# Patient Record
Sex: FEMALE | Race: WHITE | NOT HISPANIC OR LATINO | Employment: FULL TIME | URBAN - METROPOLITAN AREA
[De-identification: names, ages, dates, MRNs, and addresses within clinical notes are randomized per-mention and may not be internally consistent; named-entity substitution may affect disease eponyms.]

---

## 2019-02-06 LAB
INFLUENZA A ANTIGEN, POC: NEGATIVE
INFLUENZA B ANTIGEN, POC: NEGATIVE
RAPID GROUP A STREP (OHS): NEGATIVE

## 2019-03-11 LAB — RAPID GROUP A STREP (OHS): NEGATIVE

## 2022-04-11 ENCOUNTER — HISTORICAL (OUTPATIENT)
Dept: ADMINISTRATIVE | Facility: HOSPITAL | Age: 27
End: 2022-04-11

## 2022-04-28 VITALS
DIASTOLIC BLOOD PRESSURE: 90 MMHG | HEIGHT: 70 IN | OXYGEN SATURATION: 98 % | SYSTOLIC BLOOD PRESSURE: 136 MMHG | BODY MASS INDEX: 25.91 KG/M2 | WEIGHT: 181 LBS

## 2022-09-21 ENCOUNTER — HISTORICAL (OUTPATIENT)
Dept: ADMINISTRATIVE | Facility: HOSPITAL | Age: 27
End: 2022-09-21

## 2024-05-16 ENCOUNTER — OFFICE VISIT (OUTPATIENT)
Dept: INTERNAL MEDICINE | Facility: CLINIC | Age: 29
End: 2024-05-16
Payer: COMMERCIAL

## 2024-05-16 VITALS
WEIGHT: 168 LBS | OXYGEN SATURATION: 98 % | SYSTOLIC BLOOD PRESSURE: 120 MMHG | HEIGHT: 70 IN | BODY MASS INDEX: 24.05 KG/M2 | TEMPERATURE: 98 F | DIASTOLIC BLOOD PRESSURE: 72 MMHG | HEART RATE: 98 BPM

## 2024-05-16 DIAGNOSIS — F41.9 ANXIETY: ICD-10-CM

## 2024-05-16 DIAGNOSIS — T75.3XXA MOTION SICKNESS, INITIAL ENCOUNTER: ICD-10-CM

## 2024-05-16 DIAGNOSIS — Z00.00 WELL ADULT EXAM: Primary | ICD-10-CM

## 2024-05-16 PROCEDURE — 1160F RVW MEDS BY RX/DR IN RCRD: CPT | Mod: CPTII,,, | Performed by: NURSE PRACTITIONER

## 2024-05-16 PROCEDURE — 3008F BODY MASS INDEX DOCD: CPT | Mod: CPTII,,, | Performed by: NURSE PRACTITIONER

## 2024-05-16 PROCEDURE — 99385 PREV VISIT NEW AGE 18-39: CPT | Mod: ,,, | Performed by: NURSE PRACTITIONER

## 2024-05-16 PROCEDURE — 1159F MED LIST DOCD IN RCRD: CPT | Mod: CPTII,,, | Performed by: NURSE PRACTITIONER

## 2024-05-16 PROCEDURE — 3078F DIAST BP <80 MM HG: CPT | Mod: CPTII,,, | Performed by: NURSE PRACTITIONER

## 2024-05-16 PROCEDURE — 3074F SYST BP LT 130 MM HG: CPT | Mod: CPTII,,, | Performed by: NURSE PRACTITIONER

## 2024-05-16 RX ORDER — NORETHINDRONE ACETATE AND ETHINYL ESTRADIOL AND FERROUS FUMARATE 1MG-20(24)
1 KIT ORAL EVERY MORNING
COMMUNITY

## 2024-05-16 RX ORDER — ONDANSETRON 4 MG/1
4 TABLET, ORALLY DISINTEGRATING ORAL EVERY 8 HOURS PRN
Qty: 12 TABLET | Refills: 0 | Status: SHIPPED | OUTPATIENT
Start: 2024-05-16

## 2024-05-16 RX ORDER — MECLIZINE HCL 12.5 MG 12.5 MG/1
12.5 TABLET ORAL 3 TIMES DAILY PRN
Qty: 30 TABLET | Refills: 0 | Status: SHIPPED | OUTPATIENT
Start: 2024-05-16

## 2024-05-16 RX ORDER — BUSPIRONE HYDROCHLORIDE 5 MG/1
5 TABLET ORAL 3 TIMES DAILY
COMMUNITY
Start: 2023-12-29

## 2024-05-16 RX ORDER — SERTRALINE HYDROCHLORIDE 25 MG/1
25 TABLET, FILM COATED ORAL DAILY
Qty: 30 TABLET | Refills: 11 | Status: SHIPPED | OUTPATIENT
Start: 2024-05-16 | End: 2025-05-16

## 2024-05-16 NOTE — ASSESSMENT & PLAN NOTE
Start Zoloft 25mg daily + continue buspar 5mg TID PRN  Follow up in 6 weeks  Practice deep breathing or abdominal breathing exercises when anxiety occurs.  Exercise daily. Get sunlight daily.  Avoid caffeine, alcohol and stimulants.  Practice positive phrases and repeat throughout the day, along with yoga and relaxation techniques.  Set healthy boundaries, avoid people and conversations that increase stress.  Educated patient on the risks of serotonin based medications such as serotonin modulators and SSRIs/SNRIs including common side effects of nausea, GI upset, headache dizziness as well as the rare risk for worsening symptoms of depression including development of suicidal thoughts or ideations, and serotonin syndrome.   Reports any symptoms of suicidal or homicidal ideations immediately, if clinic is closed go to nearest emergency room.

## 2024-05-16 NOTE — PROGRESS NOTES
Internal Medicine    Patient ID: [id number]     Chief Complaint: Establish Care    HPI:     Hung Pa is a 28 y.o. female here today for an annual wellness visit. She has not had bloodwork completed prior to visit but will do so in the near future. Overall she feels well. She is having some daily generalized anxiety. Minimal improvement with buspar. Has motion sickness - requesting PRN medications for trips.   No other complaints today.     Health Maintenance         Date Due Completion Date    Hepatitis C Screening Never done ---    Lipid Panel Never done ---    HIV Screening Never done ---    TETANUS VACCINE Never done ---    COVID-19 Vaccine (4 - 2023-24 season) 09/01/2023 1/5/2022    Influenza Vaccine (Season Ended) 09/01/2024 ---    Pap Smear 08/21/2026 8/21/2023             Past Medical History:   Diagnosis Date    Anxiety     Motion sickness 05/16/2024        History reviewed. No pertinent surgical history.     Social History     Tobacco Use    Smoking status: Never    Smokeless tobacco: Never   Substance and Sexual Activity    Alcohol use: Never    Drug use: Never    Sexual activity: Yes     Partners: Male     Birth control/protection: OCP        Current Outpatient Medications   Medication Instructions    busPIRone (BUSPAR) 5 mg, Oral, 3 times daily    meclizine (ANTIVERT) 12.5 mg, Oral, 3 times daily PRN    norethindrone-e.estradioL-iron (MINASTRIN 24 FE) 1 mg-20 mcg(24) /75 mg (4) Chew 1 tablet, Oral, Every morning    ondansetron (ZOFRAN-ODT) 4 mg, Oral, Every 8 hours PRN    sertraline (ZOLOFT) 25 mg, Oral, Daily       Review of patient's allergies indicates:  No Known Allergies     Patient Care Team:  Bruno Lind MD as PCP - General (Internal Medicine)  Charmaine Rudolph MD as Consulting Physician (Obstetrics and Gynecology)     Subjective:     Review of Systems    12 point review of systems conducted, negative except as stated in the history of present illness. See HPI for  "details.    Objective:     Visit Vitals  /72   Pulse 98   Temp 98.2 °F (36.8 °C) (Temporal)   Ht 5' 10" (1.778 m)   Wt 76.2 kg (168 lb)   SpO2 98%   BMI 24.11 kg/m²       Physical Exam  Vitals and nursing note reviewed.   Constitutional:       General: She is not in acute distress.     Appearance: She is not ill-appearing.   HENT:      Head: Normocephalic and atraumatic.      Mouth/Throat:      Mouth: Mucous membranes are moist.      Pharynx: Oropharynx is clear.   Eyes:      General: No scleral icterus.     Extraocular Movements: Extraocular movements intact.      Conjunctiva/sclera: Conjunctivae normal.      Pupils: Pupils are equal, round, and reactive to light.   Neck:      Vascular: No carotid bruit.   Cardiovascular:      Rate and Rhythm: Normal rate and regular rhythm.      Heart sounds: No murmur heard.     No friction rub. No gallop.   Pulmonary:      Effort: Pulmonary effort is normal. No respiratory distress.      Breath sounds: Normal breath sounds. No wheezing, rhonchi or rales.   Abdominal:      General: Abdomen is flat. Bowel sounds are normal. There is no distension.      Palpations: Abdomen is soft. There is no mass.      Tenderness: There is no abdominal tenderness.   Musculoskeletal:         General: Normal range of motion.      Cervical back: Normal range of motion and neck supple.   Skin:     General: Skin is warm and dry.   Neurological:      General: No focal deficit present.      Mental Status: She is alert.   Psychiatric:         Mood and Affect: Mood normal.         Assessment:       ICD-10-CM ICD-9-CM   1. Well adult exam  Z00.00 V70.0   2. Anxiety  F41.9 300.00   3. Motion sickness, initial encounter  T75.3XXA 994.6        Plan:         1. Well adult exam  Overview:  Cervical Cancer Screening -  Dr. Rudolph. No history of abnormal paps. Follow up with GYN Clinic for Pap/Pelvic.      Breast Cancer Screening - No family history. Will initiate at 40. Consider baseline at 35.     Colon " Cancer Screening - No family history. Will initiate at 45.     Eye Exam - Recommend annually.    Dental Exam - Recommend biannual exams.     Vaccinations -   Immunization History   Administered Date(s) Administered    COVID-19, MRNA, LN-S, PF (Pfizer) (Purple Cap) 03/30/2021, 05/03/2021        Orders:  -     CBC Auto Differential; Future; Expected date: 05/16/2024  -     Comprehensive Metabolic Panel; Future; Expected date: 05/16/2024  -     Lipid Panel; Future; Expected date: 05/16/2024  -     TSH; Future; Expected date: 05/16/2024  -     Hemoglobin A1C; Future; Expected date: 05/16/2024  -     Urinalysis; Future; Expected date: 05/16/2024  -     Vitamin D; Future; Expected date: 05/16/2024    2. Anxiety  Assessment & Plan:  Start Zoloft 25mg daily + continue buspar 5mg TID PRN  Follow up in 6 weeks  Practice deep breathing or abdominal breathing exercises when anxiety occurs.  Exercise daily. Get sunlight daily.  Avoid caffeine, alcohol and stimulants.  Practice positive phrases and repeat throughout the day, along with yoga and relaxation techniques.  Set healthy boundaries, avoid people and conversations that increase stress.  Educated patient on the risks of serotonin based medications such as serotonin modulators and SSRIs/SNRIs including common side effects of nausea, GI upset, headache dizziness as well as the rare risk for worsening symptoms of depression including development of suicidal thoughts or ideations, and serotonin syndrome.   Reports any symptoms of suicidal or homicidal ideations immediately, if clinic is closed go to nearest emergency room.        3. Motion sickness, initial encounter  Assessment & Plan:  PRN Meclizine or Zofran ODT given      Other orders  -     sertraline (ZOLOFT) 25 MG tablet; Take 1 tablet (25 mg total) by mouth once daily.  Dispense: 30 tablet; Refill: 11  -     ondansetron (ZOFRAN-ODT) 4 MG TbDL; Take 1 tablet (4 mg total) by mouth every 8 (eight) hours as needed (for  nausea and vomitting).  Dispense: 12 tablet; Refill: 0  -     meclizine (ANTIVERT) 12.5 mg tablet; Take 1 tablet (12.5 mg total) by mouth 3 (three) times daily as needed for Dizziness.  Dispense: 30 tablet; Refill: 0         Follow up in about 6 weeks (around 6/27/2024) for Routine Follow Up. In addition to their scheduled follow up, the patient has also been instructed to follow up on as needed basis.     Future Appointments   Date Time Provider Department Center   6/26/2024  9:20 AM Jed Maldonado FNP [facility] [id number]          SHARON Wallace

## 2024-05-20 ENCOUNTER — LAB VISIT (OUTPATIENT)
Dept: LAB | Facility: HOSPITAL | Age: 29
End: 2024-05-20
Attending: NURSE PRACTITIONER
Payer: COMMERCIAL

## 2024-05-20 DIAGNOSIS — Z00.00 WELL ADULT EXAM: ICD-10-CM

## 2024-05-20 LAB
25(OH)D3+25(OH)D2 SERPL-MCNC: 62 NG/ML (ref 30–80)
ALBUMIN SERPL-MCNC: 3.8 G/DL (ref 3.5–5)
ALBUMIN/GLOB SERPL: 1.3 RATIO (ref 1.1–2)
ALP SERPL-CCNC: 83 UNIT/L (ref 40–150)
ALT SERPL-CCNC: 18 UNIT/L (ref 0–55)
ANION GAP SERPL CALC-SCNC: 9 MEQ/L
AST SERPL-CCNC: 15 UNIT/L (ref 5–34)
BASOPHILS # BLD AUTO: 0.01 X10(3)/MCL
BASOPHILS NFR BLD AUTO: 0.3 %
BILIRUB SERPL-MCNC: 0.9 MG/DL
BUN SERPL-MCNC: 9.2 MG/DL (ref 7–18.7)
CALCIUM SERPL-MCNC: 9.4 MG/DL (ref 8.4–10.2)
CHLORIDE SERPL-SCNC: 106 MMOL/L (ref 98–107)
CHOLEST SERPL-MCNC: 221 MG/DL
CHOLEST/HDLC SERPL: 3 {RATIO} (ref 0–5)
CO2 SERPL-SCNC: 26 MMOL/L (ref 22–29)
CREAT SERPL-MCNC: 0.82 MG/DL (ref 0.55–1.02)
CREAT/UREA NIT SERPL: 11
EOSINOPHIL # BLD AUTO: 0.02 X10(3)/MCL (ref 0–0.9)
EOSINOPHIL NFR BLD AUTO: 0.5 %
ERYTHROCYTE [DISTWIDTH] IN BLOOD BY AUTOMATED COUNT: 12.4 % (ref 11.5–17)
EST. AVERAGE GLUCOSE BLD GHB EST-MCNC: 99.7 MG/DL
GFR SERPLBLD CREATININE-BSD FMLA CKD-EPI: >60 ML/MIN/1.73/M2
GLOBULIN SER-MCNC: 3 GM/DL (ref 2.4–3.5)
GLUCOSE SERPL-MCNC: 89 MG/DL (ref 74–100)
HBA1C MFR BLD: 5.1 %
HCT VFR BLD AUTO: 42.2 % (ref 37–47)
HDLC SERPL-MCNC: 83 MG/DL (ref 35–60)
HGB BLD-MCNC: 13.8 G/DL (ref 12–16)
IMM GRANULOCYTES # BLD AUTO: 0.01 X10(3)/MCL (ref 0–0.04)
IMM GRANULOCYTES NFR BLD AUTO: 0.3 %
LDLC SERPL CALC-MCNC: 123 MG/DL (ref 50–140)
LYMPHOCYTES # BLD AUTO: 1.09 X10(3)/MCL (ref 0.6–4.6)
LYMPHOCYTES NFR BLD AUTO: 28.7 %
MCH RBC QN AUTO: 28.8 PG (ref 27–31)
MCHC RBC AUTO-ENTMCNC: 32.7 G/DL (ref 33–36)
MCV RBC AUTO: 87.9 FL (ref 80–94)
MONOCYTES # BLD AUTO: 0.38 X10(3)/MCL (ref 0.1–1.3)
MONOCYTES NFR BLD AUTO: 10 %
NEUTROPHILS # BLD AUTO: 2.29 X10(3)/MCL (ref 2.1–9.2)
NEUTROPHILS NFR BLD AUTO: 60.2 %
NRBC BLD AUTO-RTO: 0 %
PLATELET # BLD AUTO: 213 X10(3)/MCL (ref 130–400)
PMV BLD AUTO: 10.6 FL (ref 7.4–10.4)
POTASSIUM SERPL-SCNC: 4.4 MMOL/L (ref 3.5–5.1)
PROT SERPL-MCNC: 6.8 GM/DL (ref 6.4–8.3)
RBC # BLD AUTO: 4.8 X10(6)/MCL (ref 4.2–5.4)
SODIUM SERPL-SCNC: 141 MMOL/L (ref 136–145)
TRIGL SERPL-MCNC: 73 MG/DL (ref 37–140)
TSH SERPL-ACNC: 0.62 UIU/ML (ref 0.35–4.94)
VLDLC SERPL CALC-MCNC: 15 MG/DL
WBC # SPEC AUTO: 3.8 X10(3)/MCL (ref 4.5–11.5)

## 2024-05-20 PROCEDURE — 80053 COMPREHEN METABOLIC PANEL: CPT

## 2024-05-20 PROCEDURE — 82306 VITAMIN D 25 HYDROXY: CPT

## 2024-05-20 PROCEDURE — 83036 HEMOGLOBIN GLYCOSYLATED A1C: CPT

## 2024-05-20 PROCEDURE — 84443 ASSAY THYROID STIM HORMONE: CPT

## 2024-05-20 PROCEDURE — 80061 LIPID PANEL: CPT

## 2024-05-20 PROCEDURE — 85025 COMPLETE CBC W/AUTO DIFF WBC: CPT

## 2024-05-20 PROCEDURE — 36415 COLL VENOUS BLD VENIPUNCTURE: CPT

## 2024-05-21 NOTE — PROGRESS NOTES
Please inform patient of results.    1. WBC on low end of normal. Any previous labwork that we can compare? We can repeat in 1 month and add peripheral smear if not.     All other labwork within acceptable limits.    Thanks for all you do,    Jed

## 2024-05-23 ENCOUNTER — TELEPHONE (OUTPATIENT)
Dept: INTERNAL MEDICINE | Facility: CLINIC | Age: 29
End: 2024-05-23
Payer: COMMERCIAL

## 2024-05-23 DIAGNOSIS — D70.9 NEUTROPENIA, UNSPECIFIED TYPE: Primary | ICD-10-CM

## 2024-05-23 NOTE — TELEPHONE ENCOUNTER
----- Message from Juany Campo RN sent at 5/22/2024 10:30 AM CDT -----  Good Morning Zenaida,  This is not a patient of ours.  Thanks,  Juany  ----- Message -----  From: Zenaida Walton MA  Sent: 5/21/2024   4:22 PM CDT  To: [facility] Support Staff      ----- Message -----  From: Jed Maldonado FNP  Sent: 5/21/2024   3:45 PM CDT  To: [facility] Staff    Please inform patient of results.    1. WBC on low end of normal. Any previous labwork that we can compare? We can repeat in 1 month and add peripheral smear if not.     All other labwork within acceptable limits.    Thanks for all you do,    Jed

## 2024-06-24 ENCOUNTER — PATIENT MESSAGE (OUTPATIENT)
Dept: INTERNAL MEDICINE | Facility: CLINIC | Age: 29
End: 2024-06-24
Payer: COMMERCIAL

## 2024-07-09 ENCOUNTER — LAB VISIT (OUTPATIENT)
Dept: LAB | Facility: HOSPITAL | Age: 29
End: 2024-07-09
Attending: INTERNAL MEDICINE
Payer: COMMERCIAL

## 2024-07-09 ENCOUNTER — PATIENT MESSAGE (OUTPATIENT)
Dept: INTERNAL MEDICINE | Facility: CLINIC | Age: 29
End: 2024-07-09
Payer: COMMERCIAL

## 2024-07-09 DIAGNOSIS — D70.9 NEUTROPENIA, UNSPECIFIED TYPE: ICD-10-CM

## 2024-07-09 LAB
BASOPHILS # BLD AUTO: 0.01 X10(3)/MCL
BASOPHILS NFR BLD AUTO: 0.2 %
EOSINOPHIL # BLD AUTO: 0.05 X10(3)/MCL (ref 0–0.9)
EOSINOPHIL NFR BLD AUTO: 1.1 %
ERYTHROCYTE [DISTWIDTH] IN BLOOD BY AUTOMATED COUNT: 12.2 % (ref 11.5–17)
HCT VFR BLD AUTO: 38 % (ref 37–47)
HGB BLD-MCNC: 12.6 G/DL (ref 12–16)
IMM GRANULOCYTES # BLD AUTO: 0.01 X10(3)/MCL (ref 0–0.04)
IMM GRANULOCYTES NFR BLD AUTO: 0.2 %
LYMPHOCYTES # BLD AUTO: 1.76 X10(3)/MCL (ref 0.6–4.6)
LYMPHOCYTES NFR BLD AUTO: 37.4 %
MCH RBC QN AUTO: 28.4 PG (ref 27–31)
MCHC RBC AUTO-ENTMCNC: 33.2 G/DL (ref 33–36)
MCV RBC AUTO: 85.8 FL (ref 80–94)
MONOCYTES # BLD AUTO: 0.24 X10(3)/MCL (ref 0.1–1.3)
MONOCYTES NFR BLD AUTO: 5.1 %
NEUTROPHILS # BLD AUTO: 2.63 X10(3)/MCL (ref 2.1–9.2)
NEUTROPHILS NFR BLD AUTO: 56 %
NRBC BLD AUTO-RTO: 0 %
PLATELET # BLD AUTO: 228 X10(3)/MCL (ref 130–400)
PMV BLD AUTO: 10.6 FL (ref 7.4–10.4)
RBC # BLD AUTO: 4.43 X10(6)/MCL (ref 4.2–5.4)
WBC # BLD AUTO: 4.7 X10(3)/MCL (ref 4.5–11.5)

## 2024-07-09 PROCEDURE — 85025 COMPLETE CBC W/AUTO DIFF WBC: CPT

## 2024-07-09 PROCEDURE — 36415 COLL VENOUS BLD VENIPUNCTURE: CPT

## 2024-07-10 ENCOUNTER — OFFICE VISIT (OUTPATIENT)
Dept: INTERNAL MEDICINE | Facility: CLINIC | Age: 29
End: 2024-07-10
Payer: COMMERCIAL

## 2024-07-10 VITALS
OXYGEN SATURATION: 98 % | TEMPERATURE: 99 F | WEIGHT: 171.31 LBS | RESPIRATION RATE: 16 BRPM | DIASTOLIC BLOOD PRESSURE: 78 MMHG | SYSTOLIC BLOOD PRESSURE: 118 MMHG | BODY MASS INDEX: 24.52 KG/M2 | HEIGHT: 70 IN | HEART RATE: 84 BPM

## 2024-07-10 DIAGNOSIS — F41.9 ANXIETY: Primary | ICD-10-CM

## 2024-07-10 PROCEDURE — 99213 OFFICE O/P EST LOW 20 MIN: CPT | Mod: ,,, | Performed by: NURSE PRACTITIONER

## 2024-07-10 PROCEDURE — 1160F RVW MEDS BY RX/DR IN RCRD: CPT | Mod: CPTII,,, | Performed by: NURSE PRACTITIONER

## 2024-07-10 PROCEDURE — 3078F DIAST BP <80 MM HG: CPT | Mod: CPTII,,, | Performed by: NURSE PRACTITIONER

## 2024-07-10 PROCEDURE — 3008F BODY MASS INDEX DOCD: CPT | Mod: CPTII,,, | Performed by: NURSE PRACTITIONER

## 2024-07-10 PROCEDURE — 3074F SYST BP LT 130 MM HG: CPT | Mod: CPTII,,, | Performed by: NURSE PRACTITIONER

## 2024-07-10 PROCEDURE — 1159F MED LIST DOCD IN RCRD: CPT | Mod: CPTII,,, | Performed by: NURSE PRACTITIONER

## 2024-07-10 PROCEDURE — 3044F HG A1C LEVEL LT 7.0%: CPT | Mod: CPTII,,, | Performed by: NURSE PRACTITIONER

## 2024-07-10 NOTE — ASSESSMENT & PLAN NOTE
Continue Zoloft 25mg daily + continue buspar 5mg TID PRN  Practice deep breathing or abdominal breathing exercises when anxiety occurs.  Exercise daily. Get sunlight daily.  Avoid caffeine, alcohol and stimulants.  Practice positive phrases and repeat throughout the day, along with yoga and relaxation techniques.  Set healthy boundaries, avoid people and conversations that increase stress.  Educated patient on the risks of serotonin based medications such as serotonin modulators and SSRIs/SNRIs including common side effects of nausea, GI upset, headache dizziness as well as the rare risk for worsening symptoms of depression including development of suicidal thoughts or ideations, and serotonin syndrome.   Reports any symptoms of suicidal or homicidal ideations immediately, if clinic is closed go to nearest emergency room.

## 2024-07-10 NOTE — PROGRESS NOTES
"    Internal Medicine    Patient ID: [id number]     Chief Complaint: Anxiety (6 week f/u/)    HPI:     Hung Pa is a 28 y.o. female here today for a follow up. Tolerating addition of SSRI well without adverse effects. Feels this has significantly improved anxiety and irritability. Reviewed and discussed lab results - WBC normalized.  No other complaints today.     Past Medical History:   Diagnosis Date    Anxiety     Motion sickness 05/16/2024        History reviewed. No pertinent surgical history.     Social History     Tobacco Use    Smoking status: Never    Smokeless tobacco: Never   Substance and Sexual Activity    Alcohol use: Never    Drug use: Never    Sexual activity: Yes     Partners: Male     Birth control/protection: OCP        Current Outpatient Medications   Medication Instructions    meclizine (ANTIVERT) 12.5 mg, Oral, 3 times daily PRN    norethindrone-e.estradioL-iron (MINASTRIN 24 FE) 1 mg-20 mcg(24) /75 mg (4) Chew 1 tablet, Oral, Every morning    ondansetron (ZOFRAN-ODT) 4 mg, Oral, Every 8 hours PRN    sertraline (ZOLOFT) 25 mg, Oral, Daily       Review of patient's allergies indicates:  No Known Allergies     Patient Care Team:  Bruno Lind MD as PCP - General (Internal Medicine)  Charmaine Rudolph MD as Consulting Physician (Obstetrics and Gynecology)     Subjective:     Review of Systems    12 point review of systems conducted, negative except as stated in the history of present illness. See HPI for details.    Objective:     Visit Vitals  /78 (BP Location: Right arm, Patient Position: Sitting, BP Method: Medium (Manual))   Pulse 84   Temp 98.6 °F (37 °C)   Resp 16   Ht 5' 10" (1.778 m)   Wt 77.7 kg (171 lb 4.8 oz)   SpO2 98%   BMI 24.58 kg/m²       Physical Exam  Vitals and nursing note reviewed.   Constitutional:       General: She is not in acute distress.     Appearance: She is not ill-appearing.   HENT:      Head: Normocephalic and atraumatic.      Mouth/Throat:    "   Mouth: Mucous membranes are moist.      Pharynx: Oropharynx is clear.   Eyes:      General: No scleral icterus.     Extraocular Movements: Extraocular movements intact.      Conjunctiva/sclera: Conjunctivae normal.      Pupils: Pupils are equal, round, and reactive to light.   Cardiovascular:      Rate and Rhythm: Normal rate and regular rhythm.   Pulmonary:      Effort: Pulmonary effort is normal. No respiratory distress.      Breath sounds: Normal breath sounds.   Abdominal:      General: Abdomen is flat. Bowel sounds are normal.      Palpations: Abdomen is soft.   Musculoskeletal:         General: Normal range of motion.      Cervical back: Normal range of motion and neck supple.   Skin:     General: Skin is warm and dry.   Neurological:      General: No focal deficit present.      Mental Status: She is alert.   Psychiatric:         Mood and Affect: Mood normal.         Behavior: Behavior normal.         Thought Content: Thought content normal.         Labs Reviewed:     Hematology:  Lab Results   Component Value Date    WBC 4.70 07/09/2024    HGB 12.6 07/09/2024    HCT 38.0 07/09/2024     07/09/2024       Assessment:       ICD-10-CM ICD-9-CM   1. Anxiety  F41.9 300.00        Plan:     1. Anxiety  Assessment & Plan:  Continue Zoloft 25mg daily + continue buspar 5mg TID PRN  Practice deep breathing or abdominal breathing exercises when anxiety occurs.  Exercise daily. Get sunlight daily.  Avoid caffeine, alcohol and stimulants.  Practice positive phrases and repeat throughout the day, along with yoga and relaxation techniques.  Set healthy boundaries, avoid people and conversations that increase stress.  Educated patient on the risks of serotonin based medications such as serotonin modulators and SSRIs/SNRIs including common side effects of nausea, GI upset, headache dizziness as well as the rare risk for worsening symptoms of depression including development of suicidal thoughts or ideations, and  serotonin syndrome.   Reports any symptoms of suicidal or homicidal ideations immediately, if clinic is closed go to nearest emergency room.             Follow up for Annual Wellness 5/2025. In addition to their scheduled follow up, the patient has also been instructed to follow up on as needed basis.     Future Appointments   Date Time Provider Department Center   5/21/2025  8:40 AM Jed Maldonado FNP [facility] [id number]        SHARON Wallace

## 2024-08-19 PROBLEM — Z00.00 WELL ADULT EXAM: Status: RESOLVED | Noted: 2024-05-16 | Resolved: 2024-08-19

## 2025-05-10 DIAGNOSIS — F41.9 ANXIETY: Primary | ICD-10-CM

## 2025-05-12 RX ORDER — SERTRALINE HYDROCHLORIDE 25 MG/1
25 TABLET, FILM COATED ORAL
Qty: 90 TABLET | Refills: 3 | Status: SHIPPED | OUTPATIENT
Start: 2025-05-12

## 2025-05-21 ENCOUNTER — TELEPHONE (OUTPATIENT)
Dept: INTERNAL MEDICINE | Facility: CLINIC | Age: 30
End: 2025-05-21

## 2025-05-21 DIAGNOSIS — E55.9 VITAMIN D DEFICIENCY: ICD-10-CM

## 2025-05-21 DIAGNOSIS — Z00.00 WELLNESS EXAMINATION: Primary | ICD-10-CM

## 2025-05-21 DIAGNOSIS — Z13.29 SCREENING FOR HYPOTHYROIDISM: ICD-10-CM

## 2025-05-21 NOTE — TELEPHONE ENCOUNTER
----- Message from Med Assistant [place] sent at 5/21/2025  9:34 AM CDT -----  Regarding: PV 6/4/25 @ 9:20 SHARON Wallace  1. Are there any outstanding tasks in the patient's chart? Yes, Fasting Labs 2. Does patient have home blood pressure cuff?  [ ] Yes  /   [ ] No(If yes, please have patient bring to appointment for validation.)3. Remind patient to bring in a list of medications or bottles of all medications including: A. All Prescription MedicationsB. Over-the-Counter Supplements and/or VitaminsC. Drops (ear and/or eye)D. Topical Creams

## 2025-05-30 ENCOUNTER — TELEPHONE (OUTPATIENT)
Dept: INTERNAL MEDICINE | Facility: CLINIC | Age: 30
End: 2025-05-30
Payer: COMMERCIAL

## 2025-07-09 ENCOUNTER — TELEPHONE (OUTPATIENT)
Dept: INTERNAL MEDICINE | Facility: CLINIC | Age: 30
End: 2025-07-09
Payer: COMMERCIAL

## 2025-07-10 ENCOUNTER — LAB VISIT (OUTPATIENT)
Dept: LAB | Facility: HOSPITAL | Age: 30
End: 2025-07-10
Attending: INTERNAL MEDICINE
Payer: COMMERCIAL

## 2025-07-10 DIAGNOSIS — E55.9 VITAMIN D DEFICIENCY: ICD-10-CM

## 2025-07-10 DIAGNOSIS — Z00.00 WELLNESS EXAMINATION: ICD-10-CM

## 2025-07-10 DIAGNOSIS — Z13.29 SCREENING FOR HYPOTHYROIDISM: ICD-10-CM

## 2025-07-10 LAB
25(OH)D3+25(OH)D2 SERPL-MCNC: 44 NG/ML (ref 30–80)
ALBUMIN SERPL-MCNC: 4.1 G/DL (ref 3.5–5)
ALBUMIN/GLOB SERPL: 1.5 RATIO (ref 1.1–2)
ALP SERPL-CCNC: 84 UNIT/L (ref 40–150)
ALT SERPL-CCNC: 12 UNIT/L (ref 0–55)
ANION GAP SERPL CALC-SCNC: 7 MEQ/L
AST SERPL-CCNC: 14 UNIT/L (ref 11–45)
BACTERIA #/AREA URNS AUTO: ABNORMAL /HPF
BASOPHILS # BLD AUTO: 0.01 X10(3)/MCL
BASOPHILS NFR BLD AUTO: 0.3 %
BILIRUB SERPL-MCNC: 1 MG/DL
BILIRUB UR QL STRIP.AUTO: NEGATIVE
BUN SERPL-MCNC: 12.8 MG/DL (ref 7–18.7)
CALCIUM SERPL-MCNC: 9.2 MG/DL (ref 8.4–10.2)
CHLORIDE SERPL-SCNC: 105 MMOL/L (ref 98–107)
CHOLEST SERPL-MCNC: 218 MG/DL
CHOLEST/HDLC SERPL: 2 {RATIO} (ref 0–5)
CLARITY UR: CLEAR
CO2 SERPL-SCNC: 28 MMOL/L (ref 22–29)
COLOR UR AUTO: YELLOW
CREAT SERPL-MCNC: 0.72 MG/DL (ref 0.55–1.02)
CREAT/UREA NIT SERPL: 18
EOSINOPHIL # BLD AUTO: 0.05 X10(3)/MCL (ref 0–0.9)
EOSINOPHIL NFR BLD AUTO: 1.3 %
ERYTHROCYTE [DISTWIDTH] IN BLOOD BY AUTOMATED COUNT: 12.2 % (ref 11.5–17)
EST. AVERAGE GLUCOSE BLD GHB EST-MCNC: 102.5 MG/DL
GFR SERPLBLD CREATININE-BSD FMLA CKD-EPI: >60 ML/MIN/1.73/M2
GLOBULIN SER-MCNC: 2.7 GM/DL (ref 2.4–3.5)
GLUCOSE SERPL-MCNC: 89 MG/DL (ref 74–100)
GLUCOSE UR QL STRIP: NORMAL
HBA1C MFR BLD: 5.2 %
HCT VFR BLD AUTO: 41.9 % (ref 37–47)
HDLC SERPL-MCNC: 94 MG/DL (ref 35–60)
HGB BLD-MCNC: 13.3 G/DL (ref 12–16)
HGB UR QL STRIP: NEGATIVE
IMM GRANULOCYTES # BLD AUTO: 0.01 X10(3)/MCL (ref 0–0.04)
IMM GRANULOCYTES NFR BLD AUTO: 0.3 %
KETONES UR QL STRIP: NEGATIVE
LDLC SERPL CALC-MCNC: 112 MG/DL (ref 50–140)
LEUKOCYTE ESTERASE UR QL STRIP: NEGATIVE
LYMPHOCYTES # BLD AUTO: 1.28 X10(3)/MCL (ref 0.6–4.6)
LYMPHOCYTES NFR BLD AUTO: 33.4 %
MCH RBC QN AUTO: 27.8 PG (ref 27–31)
MCHC RBC AUTO-ENTMCNC: 31.7 G/DL (ref 33–36)
MCV RBC AUTO: 87.5 FL (ref 80–94)
MONOCYTES # BLD AUTO: 0.26 X10(3)/MCL (ref 0.1–1.3)
MONOCYTES NFR BLD AUTO: 6.8 %
MUCOUS THREADS URNS QL MICRO: ABNORMAL /LPF
NEUTROPHILS # BLD AUTO: 2.22 X10(3)/MCL (ref 2.1–9.2)
NEUTROPHILS NFR BLD AUTO: 57.9 %
NITRITE UR QL STRIP: NEGATIVE
NRBC BLD AUTO-RTO: 0 %
PH UR STRIP: 7.5 [PH]
PLATELET # BLD AUTO: 190 X10(3)/MCL (ref 130–400)
PMV BLD AUTO: 10.7 FL (ref 7.4–10.4)
POTASSIUM SERPL-SCNC: 4.3 MMOL/L (ref 3.5–5.1)
PROT SERPL-MCNC: 6.8 GM/DL (ref 6.4–8.3)
PROT UR QL STRIP: ABNORMAL
RBC # BLD AUTO: 4.79 X10(6)/MCL (ref 4.2–5.4)
RBC #/AREA URNS AUTO: ABNORMAL /HPF
SODIUM SERPL-SCNC: 140 MMOL/L (ref 136–145)
SP GR UR STRIP.AUTO: 1.03 (ref 1–1.03)
SQUAMOUS #/AREA URNS LPF: ABNORMAL /HPF
TRIGL SERPL-MCNC: 61 MG/DL (ref 37–140)
TSH SERPL-ACNC: 1.51 UIU/ML (ref 0.35–4.94)
UROBILINOGEN UR STRIP-ACNC: NORMAL
VLDLC SERPL CALC-MCNC: 12 MG/DL
WBC # BLD AUTO: 3.83 X10(3)/MCL (ref 4.5–11.5)
WBC #/AREA URNS AUTO: ABNORMAL /HPF

## 2025-07-10 PROCEDURE — 81003 URINALYSIS AUTO W/O SCOPE: CPT

## 2025-07-10 PROCEDURE — 36415 COLL VENOUS BLD VENIPUNCTURE: CPT

## 2025-07-10 PROCEDURE — 85025 COMPLETE CBC W/AUTO DIFF WBC: CPT

## 2025-07-10 PROCEDURE — 84443 ASSAY THYROID STIM HORMONE: CPT

## 2025-07-10 PROCEDURE — 83036 HEMOGLOBIN GLYCOSYLATED A1C: CPT

## 2025-07-10 PROCEDURE — 82306 VITAMIN D 25 HYDROXY: CPT

## 2025-07-10 PROCEDURE — 80061 LIPID PANEL: CPT

## 2025-07-10 PROCEDURE — 80053 COMPREHEN METABOLIC PANEL: CPT

## 2025-07-14 ENCOUNTER — OFFICE VISIT (OUTPATIENT)
Dept: INTERNAL MEDICINE | Facility: CLINIC | Age: 30
End: 2025-07-14
Payer: COMMERCIAL

## 2025-07-14 VITALS
BODY MASS INDEX: 25.45 KG/M2 | OXYGEN SATURATION: 99 % | RESPIRATION RATE: 16 BRPM | SYSTOLIC BLOOD PRESSURE: 124 MMHG | TEMPERATURE: 98 F | WEIGHT: 177.81 LBS | HEART RATE: 88 BPM | HEIGHT: 70 IN | DIASTOLIC BLOOD PRESSURE: 78 MMHG

## 2025-07-14 DIAGNOSIS — F41.9 ANXIETY: ICD-10-CM

## 2025-07-14 DIAGNOSIS — Z00.00 WELLNESS EXAMINATION: Primary | ICD-10-CM

## 2025-07-14 DIAGNOSIS — T75.3XXD MOTION SICKNESS, SUBSEQUENT ENCOUNTER: ICD-10-CM

## 2025-07-14 PROCEDURE — 3044F HG A1C LEVEL LT 7.0%: CPT | Mod: CPTII,,,

## 2025-07-14 PROCEDURE — 3078F DIAST BP <80 MM HG: CPT | Mod: CPTII,,,

## 2025-07-14 PROCEDURE — 3074F SYST BP LT 130 MM HG: CPT | Mod: CPTII,,,

## 2025-07-14 PROCEDURE — 1160F RVW MEDS BY RX/DR IN RCRD: CPT | Mod: CPTII,,,

## 2025-07-14 PROCEDURE — 99395 PREV VISIT EST AGE 18-39: CPT | Mod: ,,,

## 2025-07-14 PROCEDURE — 1159F MED LIST DOCD IN RCRD: CPT | Mod: CPTII,,,

## 2025-07-14 PROCEDURE — 3008F BODY MASS INDEX DOCD: CPT | Mod: CPTII,,,

## 2025-07-14 RX ORDER — SERTRALINE HYDROCHLORIDE 50 MG/1
50 TABLET, FILM COATED ORAL DAILY
Qty: 30 TABLET | Refills: 11 | Status: SHIPPED | OUTPATIENT
Start: 2025-07-14 | End: 2026-07-14

## 2025-07-14 NOTE — PROGRESS NOTES
Internal Medicine    Patient ID: [id number]     Chief Complaint: Annual Exam      HPI:     Hung Pa is a 29 y.o. female here today for an annual wellness visit. Reviewed and discussed lab results. Overall she feels well.     Reports that she has been taking Zoloft 25 mg daily. Requesting increase in dosage as she finds that anxiety has slightly worsened recently.    No other complaints.     Dr. Rudolph - GYN, UTD on Pap, due next month   Mom & Dad - no medical history   Hx Gestational DM     Health Maintenance         Date Due Completion Date    Hepatitis C Screening Never done ---    HIV Screening Never done ---    COVID-19 Vaccine (4 - 2024-25 season) 09/01/2024 1/5/2022    Influenza Vaccine (1) 09/01/2025 12/2/2021    Cervical Cancer Screening 08/26/2027 8/26/2024    TETANUS VACCINE 01/18/2032 1/18/2022    RSV Vaccine (Age 60+ and Pregnant patients) (1 - 1-dose 75+ series) 10/06/2069 ---             Past Medical History:   Diagnosis Date    Anxiety     Motion sickness 05/16/2024        History reviewed. No pertinent surgical history.     Social History     Tobacco Use    Smoking status: Never    Smokeless tobacco: Never   Substance and Sexual Activity    Alcohol use: Never    Drug use: Never    Sexual activity: Yes     Partners: Male     Birth control/protection: OCP        Current Outpatient Medications   Medication Instructions    meclizine (ANTIVERT) 12.5 mg, Oral, 3 times daily PRN    ondansetron (ZOFRAN-ODT) 4 mg, Oral, Every 8 hours PRN    sertraline (ZOLOFT) 50 mg, Oral, Daily       Review of patient's allergies indicates:  No Known Allergies     Patient Care Team:  Bruno Lind MD as PCP - General (Internal Medicine)  Charmaine Rudolph MD as Consulting Physician (Obstetrics and Gynecology)     Subjective:     Review of Systems    12 point review of systems conducted, negative except as stated in the history of present illness. See HPI for details.    Objective:     Visit Vitals  BP  "124/78 (BP Location: Right arm, Patient Position: Sitting)   Pulse 88   Temp 98.3 °F (36.8 °C) (Temporal)   Resp 16   Ht 5' 10" (1.778 m)   Wt 80.6 kg (177 lb 12.8 oz)   SpO2 99%   BMI 25.51 kg/m²          Physical Exam  Vitals and nursing note reviewed.   Constitutional:       General: She is not in acute distress.     Appearance: Normal appearance. She is not ill-appearing.   HENT:      Head: Normocephalic and atraumatic.      Mouth/Throat:      Mouth: Mucous membranes are moist.      Pharynx: Oropharynx is clear.   Eyes:      General: No scleral icterus.     Extraocular Movements: Extraocular movements intact.      Conjunctiva/sclera: Conjunctivae normal.      Pupils: Pupils are equal, round, and reactive to light.   Neck:      Vascular: No carotid bruit.   Cardiovascular:      Rate and Rhythm: Normal rate and regular rhythm.      Heart sounds: No murmur heard.     No friction rub. No gallop.   Pulmonary:      Effort: Pulmonary effort is normal. No respiratory distress.      Breath sounds: Normal breath sounds. No wheezing, rhonchi or rales.   Abdominal:      General: Abdomen is flat. Bowel sounds are normal. There is no distension.      Palpations: Abdomen is soft. There is no mass.      Tenderness: There is no abdominal tenderness.   Musculoskeletal:         General: Normal range of motion.      Cervical back: Normal range of motion and neck supple.   Skin:     General: Skin is warm and dry.   Neurological:      General: No focal deficit present.      Mental Status: She is alert.   Psychiatric:         Mood and Affect: Mood normal.         Labs Reviewed:     Chemistry:  Lab Results   Component Value Date     07/10/2025    K 4.3 07/10/2025    BUN 12.8 07/10/2025    CREATININE 0.72 07/10/2025    EGFRNORACEVR >60 07/10/2025    CALCIUM 9.2 07/10/2025    ALKPHOS 84 07/10/2025    ALBUMIN 4.1 07/10/2025    AST 14 07/10/2025    ALT 12 07/10/2025    YUXCFNHY85BV 44 07/10/2025    TSH 1.512 07/10/2025        Lab " Results   Component Value Date    HGBA1C 5.2 07/10/2025        Hematology:  Lab Results   Component Value Date    WBC 3.83 (L) 07/10/2025    HGB 13.3 07/10/2025    HCT 41.9 07/10/2025     07/10/2025       Lipid Panel:  Lab Results   Component Value Date    CHOL 218 (H) 07/10/2025    HDL 94 (H) 07/10/2025    .00 07/10/2025    TRIG 61 07/10/2025    TOTALCHOLEST 2 07/10/2025        Urine:  Lab Results   Component Value Date    APPEARANCEUA Clear 07/10/2025    SGUA 1.028 07/10/2025    PROTEINUA Trace (A) 07/10/2025    KETONESUA Negative 07/10/2025    LEUKOCYTESUR Negative 07/10/2025    RBCUA 0-5 07/10/2025    WBCUA 0-5 07/10/2025    BACTERIA Trace 07/10/2025    SQEPUA Trace 07/10/2025        Assessment:       ICD-10-CM ICD-9-CM   1. Wellness examination  Z00.00 V70.0   2. Anxiety  F41.9 300.00   3. Motion sickness, subsequent encounter  T75.3XXD V58.89     994.6        Plan:       1. Wellness examination  Overview:  Cervical Cancer Screening -  Dr. Rudolph. No history of abnormal paps. Follow up with GYN Clinic for Pap/Pelvic.      Breast Cancer Screening - No family history. Will initiate at 40. Consider baseline at 35.     Colon Cancer Screening - No family history. Will initiate at 45.     Eye Exam - Recommend annually.    Dental Exam - Recommend biannual exams.     Vaccinations -   Immunization History   Administered Date(s) Administered    COVID-19, MRNA, LN-S, PF (Pfizer) (Purple Cap) 03/30/2021, 05/03/2021        Assessment & Plan:  Age appropriate screening up to date.  General health maintenance education provided.       2. Anxiety  Assessment & Plan:  Increase to Zoloft 50 mg daily.   Educated patient on the risks of serotonin based medications such as serotonin modulators and SSRIs/SNRIs including common side effects of nausea, GI upset, headache dizziness as well as the rare risk for worsening symptoms of depression including development of suicidal thoughts or ideations, and serotonin syndrome.    Discussed benefits of medication not becoming noticeable until up to 6 weeks from start date.   Exercise daily. Get sunlight daily.  Practice positive phrases and repeat throughout the day, along with yoga and relaxation techniques.  Establish good social support, make changes to reduce stress.  Reports any symptoms of suicidal/homicidal ideations or self harm immediately, if clinic is closed go to nearest emergency room.      Orders:  -     sertraline (ZOLOFT) 50 MG tablet; Take 1 tablet (50 mg total) by mouth once daily.  Dispense: 30 tablet; Refill: 11    3. Motion sickness, subsequent encounter  Assessment & Plan:  Continue PRN Meclizine            Follow up in about 6 weeks (around 8/25/2025), or Anxiety f/u. In addition to their scheduled follow up, the patient has also been instructed to follow up on as needed basis.     Future Appointments   Date Time Provider Department Center   8/25/2025  9:00 AM Shannon Castellanos FNP [facility] [id number]        SHARON Mei

## 2025-07-14 NOTE — ASSESSMENT & PLAN NOTE
Increase to Zoloft 50 mg daily.   Educated patient on the risks of serotonin based medications such as serotonin modulators and SSRIs/SNRIs including common side effects of nausea, GI upset, headache dizziness as well as the rare risk for worsening symptoms of depression including development of suicidal thoughts or ideations, and serotonin syndrome.   Discussed benefits of medication not becoming noticeable until up to 6 weeks from start date.   Exercise daily. Get sunlight daily.  Practice positive phrases and repeat throughout the day, along with yoga and relaxation techniques.  Establish good social support, make changes to reduce stress.  Reports any symptoms of suicidal/homicidal ideations or self harm immediately, if clinic is closed go to nearest emergency room.

## 2025-08-11 ENCOUNTER — PATIENT MESSAGE (OUTPATIENT)
Dept: INTERNAL MEDICINE | Facility: CLINIC | Age: 30
End: 2025-08-11
Payer: COMMERCIAL

## 2025-08-11 DIAGNOSIS — T75.3XXD MOTION SICKNESS, SUBSEQUENT ENCOUNTER: Primary | ICD-10-CM

## 2025-08-11 DIAGNOSIS — F41.9 ANXIETY: Primary | ICD-10-CM

## 2025-08-11 RX ORDER — SERTRALINE HYDROCHLORIDE 25 MG/1
25 TABLET, FILM COATED ORAL DAILY
Qty: 30 TABLET | Refills: 5 | Status: SHIPPED | OUTPATIENT
Start: 2025-08-11

## 2025-08-11 RX ORDER — ONDANSETRON 4 MG/1
4 TABLET, ORALLY DISINTEGRATING ORAL EVERY 8 HOURS PRN
Qty: 12 TABLET | Refills: 0 | Status: SHIPPED | OUTPATIENT
Start: 2025-08-11

## 2025-08-11 RX ORDER — SERTRALINE HYDROCHLORIDE 25 MG/1
25 TABLET, FILM COATED ORAL DAILY
COMMUNITY
Start: 2025-08-07 | End: 2025-08-11 | Stop reason: SDUPTHER